# Patient Record
Sex: FEMALE | URBAN - METROPOLITAN AREA
[De-identification: names, ages, dates, MRNs, and addresses within clinical notes are randomized per-mention and may not be internally consistent; named-entity substitution may affect disease eponyms.]

---

## 2024-07-01 ENCOUNTER — LAB OUTSIDE AN ENCOUNTER (OUTPATIENT)
Dept: URBAN - METROPOLITAN AREA CLINIC 82 | Facility: CLINIC | Age: 69
End: 2024-07-01

## 2024-07-01 ENCOUNTER — OFFICE VISIT (OUTPATIENT)
Dept: URBAN - METROPOLITAN AREA CLINIC 82 | Facility: CLINIC | Age: 69
End: 2024-07-01
Payer: COMMERCIAL

## 2024-07-01 VITALS
WEIGHT: 131.8 LBS | SYSTOLIC BLOOD PRESSURE: 148 MMHG | HEART RATE: 58 BPM | BODY MASS INDEX: 26.57 KG/M2 | DIASTOLIC BLOOD PRESSURE: 75 MMHG | TEMPERATURE: 97.9 F | HEIGHT: 59 IN

## 2024-07-01 DIAGNOSIS — Z86.010 PERSONAL HISTORY OF COLONIC POLYPS: ICD-10-CM

## 2024-07-01 DIAGNOSIS — R63.4 WEIGHT LOSS, UNINTENTIONAL: ICD-10-CM

## 2024-07-01 DIAGNOSIS — R10.33 PERIUMBILICAL ABDOMINAL PAIN: ICD-10-CM

## 2024-07-01 DIAGNOSIS — R10.816 EPIGASTRIC ABDOMINAL TENDERNESS WITHOUT REBOUND TENDERNESS: ICD-10-CM

## 2024-07-01 PROBLEM — 301403003: Status: ACTIVE | Noted: 2024-07-01

## 2024-07-01 PROBLEM — 448765001: Status: ACTIVE | Noted: 2024-07-01

## 2024-07-01 PROCEDURE — 99213 OFFICE O/P EST LOW 20 MIN: CPT | Performed by: INTERNAL MEDICINE

## 2024-07-01 RX ORDER — METHOTREXATE SODIUM 2.5 MG/1
AS DIRECTED TABLET ORAL
Status: ON HOLD | COMMUNITY

## 2024-07-01 RX ORDER — PRAVASTATIN SODIUM 20 MG/1
1 TABLET TABLET ORAL ONCE A DAY
Status: ACTIVE | COMMUNITY

## 2024-07-01 RX ORDER — AMLODIPINE BESYLATE 5 MG/1
1 TABLET TABLET ORAL ONCE A DAY
Status: ACTIVE | COMMUNITY

## 2024-07-01 NOTE — HPI-TODAY'S VISIT:
70 y/o  female with overweight,HTN, HLD, that was evaluated last year for abd pain and tenderness and EGD was recommended but she never schedule procedure. Came today with similar symptoms and wants to schedule it.

## 2024-08-08 ENCOUNTER — OUT OF OFFICE VISIT (OUTPATIENT)
Dept: URBAN - METROPOLITAN AREA SURGERY CENTER 13 | Facility: SURGERY CENTER | Age: 69
End: 2024-08-08
Payer: COMMERCIAL

## 2024-08-08 ENCOUNTER — CLAIMS CREATED FROM THE CLAIM WINDOW (OUTPATIENT)
Dept: URBAN - METROPOLITAN AREA CLINIC 4 | Facility: CLINIC | Age: 69
End: 2024-08-08
Payer: COMMERCIAL

## 2024-08-08 ENCOUNTER — TELEPHONE ENCOUNTER (OUTPATIENT)
Dept: URBAN - METROPOLITAN AREA CLINIC 82 | Facility: CLINIC | Age: 69
End: 2024-08-08

## 2024-08-08 DIAGNOSIS — K31.89 OTHER DISEASES OF STOMACH AND DUODENUM: ICD-10-CM

## 2024-08-08 DIAGNOSIS — R10.13 EPIGASTRIC ABDOMINAL PAIN: ICD-10-CM

## 2024-08-08 DIAGNOSIS — K29.70 GASTRITIS, UNSPECIFIED, WITHOUT BLEEDING: ICD-10-CM

## 2024-08-08 DIAGNOSIS — K29.70 GASTRITIS WITHOUT BLEEDING, UNSPECIFIED CHRONICITY, UNSPECIFIED GASTRITIS TYPE: ICD-10-CM

## 2024-08-08 PROBLEM — 1086791000119100: Status: ACTIVE | Noted: 2024-08-08

## 2024-08-08 PROCEDURE — 43239 EGD BIOPSY SINGLE/MULTIPLE: CPT | Performed by: INTERNAL MEDICINE

## 2024-08-08 PROCEDURE — 00731 ANES UPR GI NDSC PX NOS: CPT | Performed by: STUDENT IN AN ORGANIZED HEALTH CARE EDUCATION/TRAINING PROGRAM

## 2024-08-08 PROCEDURE — 88312 SPECIAL STAINS GROUP 1: CPT | Performed by: PATHOLOGY

## 2024-08-08 PROCEDURE — 88305 TISSUE EXAM BY PATHOLOGIST: CPT | Performed by: PATHOLOGY

## 2024-08-08 RX ORDER — PRAVASTATIN SODIUM 20 MG/1
1 TABLET TABLET ORAL ONCE A DAY
Status: ACTIVE | COMMUNITY

## 2024-08-08 RX ORDER — METHOTREXATE SODIUM 2.5 MG/1
AS DIRECTED TABLET ORAL
Status: ON HOLD | COMMUNITY

## 2024-08-08 RX ORDER — AMLODIPINE BESYLATE 5 MG/1
1 TABLET TABLET ORAL ONCE A DAY
Status: ACTIVE | COMMUNITY

## 2024-08-08 RX ORDER — OMEPRAZOLE 40 MG/1
1 CAPSULE 30 MINUTES BEFORE MORNING MEAL CAPSULE, DELAYED RELEASE ORAL ONCE A DAY
Qty: 45 | Refills: 0 | OUTPATIENT
Start: 2024-08-08

## 2024-10-03 ENCOUNTER — LAB OUTSIDE AN ENCOUNTER (OUTPATIENT)
Dept: URBAN - METROPOLITAN AREA CLINIC 82 | Facility: CLINIC | Age: 69
End: 2024-10-03

## 2024-10-03 ENCOUNTER — OFFICE VISIT (OUTPATIENT)
Dept: URBAN - METROPOLITAN AREA CLINIC 82 | Facility: CLINIC | Age: 69
End: 2024-10-03
Payer: COMMERCIAL

## 2024-10-03 VITALS
BODY MASS INDEX: 25.88 KG/M2 | DIASTOLIC BLOOD PRESSURE: 79 MMHG | HEIGHT: 59 IN | HEART RATE: 71 BPM | SYSTOLIC BLOOD PRESSURE: 155 MMHG | TEMPERATURE: 97.3 F | WEIGHT: 128.4 LBS

## 2024-10-03 DIAGNOSIS — K29.60 CHEMICAL GASTRITIS: ICD-10-CM

## 2024-10-03 DIAGNOSIS — Z86.0101 PERSONAL HISTORY OF ADENOMATOUS AND SERRATED COLON POLYPS: ICD-10-CM

## 2024-10-03 PROBLEM — 428283002: Status: ACTIVE | Noted: 2024-10-03

## 2024-10-03 PROBLEM — 37693008: Status: ACTIVE | Noted: 2024-10-03

## 2024-10-03 PROCEDURE — 99213 OFFICE O/P EST LOW 20 MIN: CPT | Performed by: INTERNAL MEDICINE

## 2024-10-03 RX ORDER — PRAVASTATIN SODIUM 20 MG/1
1 TABLET TABLET ORAL ONCE A DAY
Status: ACTIVE | COMMUNITY

## 2024-10-03 RX ORDER — METHOTREXATE SODIUM 2.5 MG/1
AS DIRECTED TABLET ORAL
Status: ON HOLD | COMMUNITY

## 2024-10-03 RX ORDER — OMEPRAZOLE 40 MG/1
1 CAPSULE 30 MINUTES BEFORE MORNING MEAL CAPSULE, DELAYED RELEASE ORAL ONCE A DAY
Qty: 45 | Refills: 0 | Status: ACTIVE | COMMUNITY
Start: 2024-08-08

## 2024-10-03 RX ORDER — POLYETHYLENE GLYCOL 3350, SODIUM SULFATE ANHYDROUS, SODIUM BICARBONATE, SODIUM CHLORIDE, POTASSIUM CHLORIDE 236; 22.74; 6.74; 5.86; 2.97 G/4L; G/4L; G/4L; G/4L; G/4L
4000ML POWDER, FOR SOLUTION ORAL ONCE
Qty: 1 KIT | Refills: 0 | OUTPATIENT
Start: 2024-10-03 | End: 2024-10-04

## 2024-10-03 RX ORDER — AMLODIPINE BESYLATE 5 MG/1
1 TABLET TABLET ORAL ONCE A DAY
Status: ACTIVE | COMMUNITY

## 2024-10-03 NOTE — HPI-TODAY'S VISIT:
EGD with chmical gastritis no h pylori.Colonoscopy with tubular adenomas on 2020 new one was recommended next year.Refer intermittent RUQ pain w/o alarm symptoms.

## 2025-07-10 ENCOUNTER — LAB OUTSIDE AN ENCOUNTER (OUTPATIENT)
Dept: URBAN - METROPOLITAN AREA SURGERY CENTER 13 | Facility: SURGERY CENTER | Age: 70
End: 2025-07-10

## 2025-07-17 ENCOUNTER — OFFICE VISIT (OUTPATIENT)
Dept: URBAN - METROPOLITAN AREA SURGERY CENTER 13 | Facility: SURGERY CENTER | Age: 70
End: 2025-07-17
Payer: COMMERCIAL

## 2025-07-17 DIAGNOSIS — Z09 ENCNTR FOR F/U EXAM AFT TRTMT FOR COND OTH THAN MALIG NEOPLM: ICD-10-CM

## 2025-07-17 DIAGNOSIS — I10 HYPERTENSION, UNSPECIFIED TYPE: ICD-10-CM

## 2025-07-17 DIAGNOSIS — Z86.0100 HISTORY OF COLON POLYPS: ICD-10-CM

## 2025-07-17 PROCEDURE — 00811 ANES LWR INTST NDSC NOS: CPT

## 2025-07-17 PROCEDURE — 00811 ANES LWR INTST NDSC NOS: CPT | Performed by: ANESTHESIOLOGY

## 2025-07-17 PROCEDURE — G0105 COLORECTAL SCRN; HI RISK IND: HCPCS | Performed by: INTERNAL MEDICINE

## 2025-07-17 PROCEDURE — 0529F INTRVL 3/>YR PTS CLNSCP DOCD: CPT | Performed by: INTERNAL MEDICINE

## 2025-07-17 RX ORDER — METHOTREXATE SODIUM 2.5 MG/1
AS DIRECTED TABLET ORAL
Status: ON HOLD | COMMUNITY

## 2025-07-17 RX ORDER — AMLODIPINE BESYLATE 5 MG/1
1 TABLET TABLET ORAL ONCE A DAY
Status: ACTIVE | COMMUNITY

## 2025-07-17 RX ORDER — OMEPRAZOLE 40 MG/1
1 CAPSULE 30 MINUTES BEFORE MORNING MEAL CAPSULE, DELAYED RELEASE ORAL ONCE A DAY
Qty: 45 | Refills: 0 | Status: ACTIVE | COMMUNITY
Start: 2024-08-08

## 2025-07-17 RX ORDER — PRAVASTATIN SODIUM 20 MG/1
1 TABLET TABLET ORAL ONCE A DAY
Status: ACTIVE | COMMUNITY